# Patient Record
Sex: OTHER/UNKNOWN | Race: WHITE | URBAN - METROPOLITAN AREA
[De-identification: names, ages, dates, MRNs, and addresses within clinical notes are randomized per-mention and may not be internally consistent; named-entity substitution may affect disease eponyms.]

---

## 2019-02-28 ENCOUNTER — APPOINTMENT (OUTPATIENT)
Age: 59
Setting detail: DERMATOLOGY
End: 2019-03-04

## 2019-02-28 VITALS
HEART RATE: 60 BPM | SYSTOLIC BLOOD PRESSURE: 131 MMHG | WEIGHT: 195 LBS | DIASTOLIC BLOOD PRESSURE: 85 MMHG | HEIGHT: 67 IN

## 2019-02-28 DIAGNOSIS — D485 NEOPLASM OF UNCERTAIN BEHAVIOR OF SKIN: ICD-10-CM

## 2019-02-28 DIAGNOSIS — L71.8 OTHER ROSACEA: ICD-10-CM

## 2019-02-28 DIAGNOSIS — L85.3 XEROSIS CUTIS: ICD-10-CM

## 2019-02-28 PROBLEM — F41.9 ANXIETY DISORDER, UNSPECIFIED: Status: ACTIVE | Noted: 2019-02-28

## 2019-02-28 PROBLEM — D48.5 NEOPLASM OF UNCERTAIN BEHAVIOR OF SKIN: Status: ACTIVE | Noted: 2019-02-28

## 2019-02-28 PROBLEM — F32.9 MAJOR DEPRESSIVE DISORDER, SINGLE EPISODE, UNSPECIFIED: Status: ACTIVE | Noted: 2019-02-28

## 2019-02-28 PROBLEM — E78.5 HYPERLIPIDEMIA, UNSPECIFIED: Status: ACTIVE | Noted: 2019-02-28

## 2019-02-28 PROBLEM — E03.9 HYPOTHYROIDISM, UNSPECIFIED: Status: ACTIVE | Noted: 2019-02-28

## 2019-02-28 PROCEDURE — OTHER MIPS QUALITY: OTHER

## 2019-02-28 PROCEDURE — OTHER COUNSELING: OTHER

## 2019-02-28 PROCEDURE — OTHER BIOPSY BY SHAVE METHOD: OTHER

## 2019-02-28 PROCEDURE — 99202 OFFICE O/P NEW SF 15 MIN: CPT | Mod: 25

## 2019-02-28 PROCEDURE — OTHER TREATMENT REGIMEN: OTHER

## 2019-02-28 PROCEDURE — 11102 TANGNTL BX SKIN SINGLE LES: CPT

## 2019-02-28 ASSESSMENT — LOCATION SIMPLE DESCRIPTION DERM
LOCATION SIMPLE: LEFT NOSE
LOCATION SIMPLE: RIGHT CHEEK
LOCATION SIMPLE: INFERIOR FOREHEAD

## 2019-02-28 ASSESSMENT — LOCATION DETAILED DESCRIPTION DERM
LOCATION DETAILED: LEFT NASAL SIDEWALL
LOCATION DETAILED: INFERIOR MID FOREHEAD
LOCATION DETAILED: RIGHT MEDIAL MALAR CHEEK

## 2019-02-28 ASSESSMENT — LOCATION ZONE DERM
LOCATION ZONE: FACE
LOCATION ZONE: NOSE

## 2019-02-28 NOTE — PROCEDURE: TREATMENT REGIMEN
Initiate Treatment: Cleanse twice a day followed my a moisturizer, day and night.
Detail Level: Zone
Samples Given: Finacea: apply foam once daily at night after cleaning. You may moisturize after.

## 2019-02-28 NOTE — PROCEDURE: BIOPSY BY SHAVE METHOD
Bill For Surgical Tray: no
X Size Of Lesion In Cm: 0
Type Of Destruction Used: Curettage
Detail Level: Detailed
Anesthesia Type: 1% lidocaine without epinephrine
Wound Care: No ointment
Depth Of Biopsy: dermis
Hemostasis: Drysol
Consent: Written consent was obtained and risks were reviewed including but not limited to scarring, infection, bleeding, scabbing, incomplete removal, nerve damage and allergy to anesthesia.
Silver Nitrate Text: The wound bed was treated with silver nitrate after the biopsy was performed.
Electrodesiccation And Curettage Text: The wound bed was treated with electrodesiccation and curettage after the biopsy was performed.
Anesthesia Volume In Cc (Will Not Render If 0): 0.2
Biopsy Method: Dermablade
Post-Care Instructions: I reviewed with the patient in detail post-care instructions. Patient is to keep the biopsy site dry overnight, and then apply bacitracin twice daily until healed. Patient may apply hydrogen peroxide soaks to remove any crusting.
Cryotherapy Text: The wound bed was treated with cryotherapy after the biopsy was performed.
Notification Instructions: Patient will be notified of biopsy results. However, patient instructed to call the office if not contacted within 2 weeks.
Billing Type: Third-Party Bill
Was A Bandage Applied: Yes
Dressing: pressure dressing
Biopsy Type: H and E
Electrodesiccation Text: The wound bed was treated with electrodesiccation after the biopsy was performed.

## 2019-02-28 NOTE — HPI: EVALUATION OF SKIN LESION(S)
What Type Of Note Output Would You Prefer (Optional)?: Standard Output
How Severe Are Your Spot(S)?: mild
Have Your Spot(S) Been Treated In The Past?: has been treated
Hpi Title: Evaluation of Skin Lesions
Additional History: Patient had a basal cell carcinoma removed on March of 2018, patient believes lesion is recurring.
When Was It Treated?: March 2018

## 2019-03-28 ENCOUNTER — APPOINTMENT (OUTPATIENT)
Age: 59
Setting detail: DERMATOLOGY
End: 2019-04-02

## 2019-03-28 VITALS
SYSTOLIC BLOOD PRESSURE: 122 MMHG | WEIGHT: 195 LBS | DIASTOLIC BLOOD PRESSURE: 82 MMHG | HEART RATE: 75 BPM | HEIGHT: 67 IN

## 2019-03-28 DIAGNOSIS — I78.8 OTHER DISEASES OF CAPILLARIES: ICD-10-CM

## 2019-03-28 DIAGNOSIS — L81.4 OTHER MELANIN HYPERPIGMENTATION: ICD-10-CM

## 2019-03-28 DIAGNOSIS — L71.8 OTHER ROSACEA: ICD-10-CM

## 2019-03-28 PROBLEM — Z85.828 PERSONAL HISTORY OF OTHER MALIGNANT NEOPLASM OF SKIN: Status: ACTIVE | Noted: 2019-03-28

## 2019-03-28 PROBLEM — D48.5 NEOPLASM OF UNCERTAIN BEHAVIOR OF SKIN: Status: ACTIVE | Noted: 2019-03-28

## 2019-03-28 PROCEDURE — OTHER PRESCRIPTION: OTHER

## 2019-03-28 PROCEDURE — 99214 OFFICE O/P EST MOD 30 MIN: CPT

## 2019-03-28 PROCEDURE — OTHER PRESCRIPTION SAMPLES PROVIDED: OTHER

## 2019-03-28 PROCEDURE — OTHER REASSURANCE: OTHER

## 2019-03-28 PROCEDURE — OTHER MIPS QUALITY: OTHER

## 2019-03-28 PROCEDURE — OTHER COUNSELING: OTHER

## 2019-03-28 RX ORDER — IVERMECTIN 10 MG/G
1 CREAM TOPICAL QPM
Qty: 1 | Refills: 3 | Status: ERX | COMMUNITY
Start: 2019-03-28

## 2019-03-28 ASSESSMENT — LOCATION DETAILED DESCRIPTION DERM
LOCATION DETAILED: LEFT MEDIAL MALAR CHEEK
LOCATION DETAILED: RIGHT CENTRAL MALAR CHEEK
LOCATION DETAILED: RIGHT PROXIMAL DORSAL FOREARM

## 2019-03-28 ASSESSMENT — LOCATION SIMPLE DESCRIPTION DERM
LOCATION SIMPLE: RIGHT CHEEK
LOCATION SIMPLE: RIGHT FOREARM
LOCATION SIMPLE: LEFT CHEEK

## 2019-03-28 ASSESSMENT — LOCATION ZONE DERM
LOCATION ZONE: FACE
LOCATION ZONE: ARM